# Patient Record
Sex: FEMALE | Race: OTHER | HISPANIC OR LATINO | Employment: OTHER | URBAN - METROPOLITAN AREA
[De-identification: names, ages, dates, MRNs, and addresses within clinical notes are randomized per-mention and may not be internally consistent; named-entity substitution may affect disease eponyms.]

---

## 2023-09-18 ENCOUNTER — HOSPITAL ENCOUNTER (EMERGENCY)
Facility: HOSPITAL | Age: 58
Discharge: HOME | End: 2023-09-19
Attending: EMERGENCY MEDICINE
Payer: MEDICARE

## 2023-09-18 ENCOUNTER — APPOINTMENT (EMERGENCY)
Dept: CT IMAGING | Facility: HOSPITAL | Age: 58
End: 2023-09-18
Payer: MEDICARE

## 2023-09-18 ENCOUNTER — APPOINTMENT (EMERGENCY)
Dept: RADIOLOGY | Facility: HOSPITAL | Age: 58
End: 2023-09-18
Payer: MEDICARE

## 2023-09-18 DIAGNOSIS — I10 HYPERTENSION: Primary | ICD-10-CM

## 2023-09-18 DIAGNOSIS — R51.9 HEADACHE: ICD-10-CM

## 2023-09-18 DIAGNOSIS — R11.0 NAUSEA: ICD-10-CM

## 2023-09-18 LAB
ALBUMIN SERPL BCP-MCNC: 4.5 G/DL (ref 3.5–5)
ALP SERPL-CCNC: 59 U/L (ref 34–104)
ALT SERPL W P-5'-P-CCNC: 16 U/L (ref 7–52)
ANION GAP SERPL CALCULATED.3IONS-SCNC: 8 MMOL/L
AST SERPL W P-5'-P-CCNC: 17 U/L (ref 13–39)
BACTERIA UR QL AUTO: NORMAL /HPF
BASOPHILS # BLD AUTO: 0.02 THOUSANDS/ÂΜL (ref 0–0.1)
BASOPHILS NFR BLD AUTO: 0 % (ref 0–1)
BILIRUB SERPL-MCNC: 0.44 MG/DL (ref 0.2–1)
BILIRUB UR QL STRIP: NEGATIVE
BUN SERPL-MCNC: 10 MG/DL (ref 5–25)
CALCIUM SERPL-MCNC: 9.4 MG/DL (ref 8.4–10.2)
CARDIAC TROPONIN I PNL SERPL HS: 3 NG/L
CHLORIDE SERPL-SCNC: 100 MMOL/L (ref 96–108)
CLARITY UR: CLEAR
CO2 SERPL-SCNC: 29 MMOL/L (ref 21–32)
COLOR UR: ABNORMAL
CREAT SERPL-MCNC: 0.75 MG/DL (ref 0.6–1.3)
EOSINOPHIL # BLD AUTO: 0.07 THOUSAND/ÂΜL (ref 0–0.61)
EOSINOPHIL NFR BLD AUTO: 1 % (ref 0–6)
ERYTHROCYTE [DISTWIDTH] IN BLOOD BY AUTOMATED COUNT: 12.7 % (ref 11.6–15.1)
GFR SERPL CREATININE-BSD FRML MDRD: 88 ML/MIN/1.73SQ M
GLUCOSE SERPL-MCNC: 156 MG/DL (ref 65–140)
GLUCOSE UR STRIP-MCNC: NEGATIVE MG/DL
HCT VFR BLD AUTO: 38.7 % (ref 34.8–46.1)
HGB BLD-MCNC: 13.4 G/DL (ref 11.5–15.4)
HGB UR QL STRIP.AUTO: 25
IMM GRANULOCYTES # BLD AUTO: 0.03 THOUSAND/UL (ref 0–0.2)
IMM GRANULOCYTES NFR BLD AUTO: 1 % (ref 0–2)
KETONES UR STRIP-MCNC: NEGATIVE MG/DL
LEUKOCYTE ESTERASE UR QL STRIP: NEGATIVE
LYMPHOCYTES # BLD AUTO: 2.84 THOUSANDS/ÂΜL (ref 0.6–4.47)
LYMPHOCYTES NFR BLD AUTO: 49 % (ref 14–44)
MCH RBC QN AUTO: 34.7 PG (ref 26.8–34.3)
MCHC RBC AUTO-ENTMCNC: 34.6 G/DL (ref 31.4–37.4)
MCV RBC AUTO: 100 FL (ref 82–98)
MONOCYTES # BLD AUTO: 0.46 THOUSAND/ÂΜL (ref 0.17–1.22)
MONOCYTES NFR BLD AUTO: 8 % (ref 4–12)
NEUTROPHILS # BLD AUTO: 2.35 THOUSANDS/ÂΜL (ref 1.85–7.62)
NEUTS SEG NFR BLD AUTO: 41 % (ref 43–75)
NITRITE UR QL STRIP: NEGATIVE
NON-SQ EPI CELLS URNS QL MICRO: NORMAL /HPF
NRBC BLD AUTO-RTO: 0 /100 WBCS
PH UR STRIP.AUTO: 7 [PH]
PLATELET # BLD AUTO: 253 THOUSANDS/UL (ref 149–390)
PMV BLD AUTO: 9.2 FL (ref 8.9–12.7)
POTASSIUM SERPL-SCNC: 3.8 MMOL/L (ref 3.5–5.3)
PROT SERPL-MCNC: 7.3 G/DL (ref 6.4–8.4)
PROT UR STRIP-MCNC: NEGATIVE MG/DL
RBC # BLD AUTO: 3.86 MILLION/UL (ref 3.81–5.12)
RBC #/AREA URNS AUTO: NORMAL /HPF
SODIUM SERPL-SCNC: 137 MMOL/L (ref 135–147)
SP GR UR STRIP.AUTO: 1.01 (ref 1–1.04)
UROBILINOGEN UA: NEGATIVE MG/DL
WBC # BLD AUTO: 5.77 THOUSAND/UL (ref 4.31–10.16)
WBC #/AREA URNS AUTO: NORMAL /HPF

## 2023-09-18 PROCEDURE — 81001 URINALYSIS AUTO W/SCOPE: CPT | Performed by: EMERGENCY MEDICINE

## 2023-09-18 PROCEDURE — 71046 X-RAY EXAM CHEST 2 VIEWS: CPT

## 2023-09-18 PROCEDURE — 85025 COMPLETE CBC W/AUTO DIFF WBC: CPT | Performed by: EMERGENCY MEDICINE

## 2023-09-18 PROCEDURE — 99284 EMERGENCY DEPT VISIT MOD MDM: CPT

## 2023-09-18 PROCEDURE — 84484 ASSAY OF TROPONIN QUANT: CPT | Performed by: EMERGENCY MEDICINE

## 2023-09-18 PROCEDURE — 80053 COMPREHEN METABOLIC PANEL: CPT | Performed by: EMERGENCY MEDICINE

## 2023-09-18 PROCEDURE — 99285 EMERGENCY DEPT VISIT HI MDM: CPT | Performed by: EMERGENCY MEDICINE

## 2023-09-18 PROCEDURE — G1004 CDSM NDSC: HCPCS

## 2023-09-18 PROCEDURE — 70450 CT HEAD/BRAIN W/O DYE: CPT

## 2023-09-18 PROCEDURE — 96361 HYDRATE IV INFUSION ADD-ON: CPT

## 2023-09-18 PROCEDURE — 36415 COLL VENOUS BLD VENIPUNCTURE: CPT | Performed by: EMERGENCY MEDICINE

## 2023-09-18 PROCEDURE — 93005 ELECTROCARDIOGRAM TRACING: CPT

## 2023-09-18 RX ORDER — DULOXETIN HYDROCHLORIDE 30 MG/1
30 CAPSULE, DELAYED RELEASE ORAL DAILY
COMMUNITY
Start: 2023-09-12 | End: 2024-09-11

## 2023-09-18 RX ORDER — BUSPIRONE HYDROCHLORIDE 10 MG/1
10 TABLET ORAL 2 TIMES DAILY
COMMUNITY
Start: 2022-10-31 | End: 2023-10-26

## 2023-09-18 RX ORDER — ENALAPRIL MALEATE 20 MG/1
40 TABLET ORAL
COMMUNITY
Start: 2023-01-05 | End: 2024-01-05

## 2023-09-18 RX ADMIN — SODIUM CHLORIDE 1000 ML: 0.9 INJECTION, SOLUTION INTRAVENOUS at 23:10

## 2023-09-19 VITALS
RESPIRATION RATE: 16 BRPM | HEART RATE: 75 BPM | TEMPERATURE: 99.4 F | DIASTOLIC BLOOD PRESSURE: 78 MMHG | SYSTOLIC BLOOD PRESSURE: 134 MMHG | OXYGEN SATURATION: 99 % | WEIGHT: 159.5 LBS

## 2023-09-19 LAB
ATRIAL RATE: 92 BPM
P AXIS: 29 DEGREES
PR INTERVAL: 108 MS
QRS AXIS: -8 DEGREES
QRSD INTERVAL: 88 MS
QT INTERVAL: 372 MS
QTC INTERVAL: 460 MS
T WAVE AXIS: 54 DEGREES
VENTRICULAR RATE: 92 BPM

## 2023-09-19 PROCEDURE — 93010 ELECTROCARDIOGRAM REPORT: CPT | Performed by: INTERNAL MEDICINE

## 2023-09-19 PROCEDURE — 96374 THER/PROPH/DIAG INJ IV PUSH: CPT

## 2023-09-19 PROCEDURE — 96361 HYDRATE IV INFUSION ADD-ON: CPT

## 2023-09-19 RX ORDER — ONDANSETRON 2 MG/ML
4 INJECTION INTRAMUSCULAR; INTRAVENOUS ONCE
Status: COMPLETED | OUTPATIENT
Start: 2023-09-19 | End: 2023-09-19

## 2023-09-19 RX ORDER — ONDANSETRON 4 MG/1
4 TABLET, ORALLY DISINTEGRATING ORAL EVERY 8 HOURS PRN
Qty: 10 TABLET | Refills: 0 | Status: SHIPPED | OUTPATIENT
Start: 2023-09-19

## 2023-09-19 RX ORDER — ACETAMINOPHEN 325 MG/1
975 TABLET ORAL ONCE
Status: COMPLETED | OUTPATIENT
Start: 2023-09-19 | End: 2023-09-19

## 2023-09-19 RX ADMIN — ACETAMINOPHEN 975 MG: 325 TABLET ORAL at 00:29

## 2023-09-19 RX ADMIN — ONDANSETRON 4 MG: 2 INJECTION INTRAMUSCULAR; INTRAVENOUS at 00:29

## 2023-09-19 NOTE — ED PROVIDER NOTES
History  Chief Complaint   Patient presents with   • High Blood Pressure     Pt reports she started feeling strange at home around 10 pm, reports having a halo in her left eye, then lightheadedness. Reports her BP was 176/89.      52-year-old female presents with complaint of hypertension. She reports taking her medications as prescribed. This evening she had mild headache and lightheadedness which have since resolved. She denies chest pain or focal neurological deficits. She was having mild trouble breathing which she described as a congestion type sensation in her nose and throat. That has also improved. Hypertension  Severity:  Moderate  Onset quality:  Gradual  Timing:  Constant  Progression:  Partially resolved  Chronicity:  Recurrent  Relieved by:  Nothing  Worsened by:  Nothing  Ineffective treatments:  None tried  Associated symptoms: headaches (mild, resolved) and shortness of breath (mild, resolved)    Associated symptoms: no abdominal pain, no blurred vision, no chest pain, no confusion, no epistaxis, no loss of consciousness, no nausea and no peripheral edema        Prior to Admission Medications   Prescriptions Last Dose Informant Patient Reported? Taking?    DULoxetine (CYMBALTA) 30 mg delayed release capsule Not Taking  Yes No   Sig: Take 30 mg by mouth daily   Patient not taking: Reported on 9/18/2023   busPIRone (BUSPAR) 10 mg tablet Not Taking  Yes No   Sig: Take 10 mg by mouth 2 (two) times a day   Patient not taking: Reported on 9/18/2023   enalapril (VASOTEC) 20 mg tablet 9/18/2023  Yes Yes   Sig: Take 40 mg by mouth   rucaparib (RUBRACA) 300 mg tablet 9/18/2023  Yes Yes   Sig: Take 600 mg by mouth every 12 (twelve) hours      Facility-Administered Medications: None       Past Medical History:   Diagnosis Date   • Asthma    • Hypertension    • Ovarian cancer [facility])    • Prediabetes        Past Surgical History:   Procedure Laterality Date   • CT NEEDLE BIOPSY LYMPH NODE  06/30/2022   • HYSTERECTOMY         History reviewed. No pertinent family history. I have reviewed and agree with the history as documented. E-Cigarette/Vaping   • E-Cigarette Use Never User      E-Cigarette/Vaping Substances     Social History     Tobacco Use   • Smoking status: Never   • Smokeless tobacco: Never   Vaping Use   • Vaping Use: Never used   Substance Use Topics   • Alcohol use: Never   • Drug use: Never       Review of Systems   HENT: Negative for nosebleeds. Eyes: Negative for blurred vision. Respiratory: Positive for shortness of breath (mild, resolved). Cardiovascular: Negative for chest pain. Gastrointestinal: Negative for abdominal pain and nausea. Neurological: Positive for headaches (mild, resolved). Negative for loss of consciousness. Psychiatric/Behavioral: Negative for confusion. All other systems reviewed and are negative. Physical Exam  Physical Exam  Vitals and nursing note reviewed. Constitutional:       General: She is not in acute distress. Appearance: Normal appearance. She is well-developed. She is not ill-appearing or toxic-appearing. HENT:      Head: Normocephalic and atraumatic. Right Ear: External ear normal.      Left Ear: External ear normal.      Nose: Nose normal. No congestion. Mouth/Throat:      Mouth: Mucous membranes are moist.      Pharynx: Oropharynx is clear. Eyes:      Conjunctiva/sclera: Conjunctivae normal.      Pupils: Pupils are equal, round, and reactive to light. Cardiovascular:      Rate and Rhythm: Normal rate and regular rhythm. Heart sounds: Normal heart sounds. Pulmonary:      Effort: Pulmonary effort is normal. No respiratory distress. Breath sounds: Normal breath sounds. No wheezing. Abdominal:      General: Bowel sounds are normal.      Palpations: Abdomen is soft. Tenderness: There is no abdominal tenderness. There is no guarding. Musculoskeletal:         General: No tenderness or deformity. Cervical back: Normal range of motion and neck supple. No rigidity. Skin:     General: Skin is warm and dry. Capillary Refill: Capillary refill takes less than 2 seconds. Findings: No rash. Neurological:      General: No focal deficit present. Mental Status: She is alert and oriented to person, place, and time.    Psychiatric:         Mood and Affect: Mood normal.         Behavior: Behavior normal.         Vital Signs  ED Triage Vitals   Temperature Pulse Respirations Blood Pressure SpO2   09/18/23 2227 09/18/23 2227 09/18/23 2227 09/18/23 2227 09/18/23 2227   99.4 °F (37.4 °C) 98 20 (!) 173/93 100 %      Temp Source Heart Rate Source Patient Position - Orthostatic VS BP Location FiO2 (%)   09/18/23 2227 09/18/23 2227 09/18/23 2227 09/18/23 2227 --   Tympanic Monitor Sitting Right arm       Pain Score       09/19/23 0029       7           Vitals:    09/18/23 2227 09/18/23 2300 09/19/23 0015   BP: (!) 173/93 139/83 134/78   Pulse: 98  75   Patient Position - Orthostatic VS: Sitting Sitting Sitting         Visual Acuity      ED Medications  Medications   sodium chloride 0.9 % bolus 1,000 mL (1,000 mL Intravenous New Bag 9/18/23 2310)   acetaminophen (TYLENOL) tablet 975 mg (975 mg Oral Given 9/19/23 0029)   ondansetron (ZOFRAN) injection 4 mg (4 mg Intravenous Given 9/19/23 0029)       Diagnostic Studies  Results Reviewed     Procedure Component Value Units Date/Time    HS Troponin I 4hr [[id number]]     Lab Status: No result Specimen: Blood     Comprehensive metabolic panel [[id number]]  (Abnormal) Collected: 09/18/23 2251    Lab Status: Final result Specimen: Blood from Arm, Right Updated: 09/18/23 2327     Sodium 137 mmol/L      Potassium 3.8 mmol/L      Chloride 100 mmol/L      CO2 29 mmol/L      ANION GAP 8 mmol/L      BUN 10 mg/dL      Creatinine 0.75 mg/dL      Glucose 156 mg/dL      Calcium 9.4 mg/dL      AST 17 U/L      ALT 16 U/L      Alkaline Phosphatase 59 U/L      Total Protein 7.3 g/dL Albumin 4.5 g/dL      Total Bilirubin 0.44 mg/dL      eGFR 88 ml/min/1.73sq m     Narrative:      Specimen Lipemic.   National Kidney Disease Foundation guidelines for Chronic Kidney Disease (CKD):   •  Stage 1 with normal or high GFR (GFR > 90 mL/min/1.73 square meters)  •  Stage 2 Mild CKD (GFR = 60-89 mL/min/1.73 square meters)  •  Stage 3A Moderate CKD (GFR = 45-59 mL/min/1.73 square meters)  •  Stage 3B Moderate CKD (GFR = 30-44 mL/min/1.73 square meters)  •  Stage 4 Severe CKD (GFR = 15-29 mL/min/1.73 square meters)  •  Stage 5 End Stage CKD (GFR <15 mL/min/1.73 square meters)  Note: GFR calculation is accurate only with a steady state creatinine    HS Troponin 0hr (reflex protocol) [[id number]]  (Normal) Collected: 09/18/23 2251    Lab Status: Final result Specimen: Blood from Arm, Right Updated: 09/18/23 2326     hs TnI 0hr 3 ng/L     HS Troponin I 2hr [[id number]]     Lab Status: No result Specimen: Blood     Urine Microscopic [[id number]]  (Normal) Collected: 09/18/23 2257    Lab Status: Final result Specimen: Urine, Clean Catch Updated: 09/18/23 2313     RBC, UA 0-1 /hpf      WBC, UA None Seen /hpf      Epithelial Cells Occasional /hpf      Bacteria, UA Occasional /hpf     UA (URINE) with reflex to Scope [[id number]]  (Abnormal) Collected: 09/18/23 2257    Lab Status: Final result Specimen: Urine, Clean Catch Updated: 09/18/23 2313     Color, UA Straw     Clarity, UA Clear     Specific Gravity, UA 1.010     pH, UA 7.0     Leukocytes, UA Negative     Nitrite, UA Negative     Protein, UA Negative mg/dl      Glucose, UA Negative mg/dl      Ketones, UA Negative mg/dl      Bilirubin, UA Negative     Occult Blood, UA 25.0     UROBILINOGEN UA Negative mg/dL     CBC and differential [[id number]]  (Abnormal) Collected: 09/18/23 2251    Lab Status: Final result Specimen: Blood from Arm, Right Updated: 09/18/23 2304     WBC 5.77 Thousand/uL      RBC 3.86 Million/uL      Hemoglobin 13.4 g/dL      Hematocrit 38.7 %  fL      MCH 34.7 pg      MCHC 34.6 g/dL      RDW 12.7 %      MPV 9.2 fL      Platelets 378 Thousands/uL      nRBC 0 /100 WBCs      Neutrophils Relative 41 %      Immat GRANS % 1 %      Lymphocytes Relative 49 %      Monocytes Relative 8 %      Eosinophils Relative 1 %      Basophils Relative 0 %      Neutrophils Absolute 2.35 Thousands/µL      Immature Grans Absolute 0.03 Thousand/uL      Lymphocytes Absolute 2.84 Thousands/µL      Monocytes Absolute 0.46 Thousand/µL      Eosinophils Absolute 0.07 Thousand/µL      Basophils Absolute 0.02 Thousands/µL                  XR chest 2 views   ED Interpretation by Lizzy Pittman DO (09/19 0007)   No acute findings        CT head without contrast   Final Result by Lauryn Diana MD (09/18 2358)      No acute intracranial abnormality. Left maxillary sinus opacification partially seen. Workstation performed: [id number]                    Procedures  ECG 12 Lead Documentation Only    Date/Time: 9/18/2023 10:35 PM    Performed by: Lizzy Pittman DO  Authorized by: Lizzy Pittman DO    ECG reviewed by me, the ED Provider: yes    Patient location:  ED  Rate:     ECG rate:  92    ECG rate assessment: normal    Rhythm:     Rhythm: sinus rhythm    Ectopy:     Ectopy: none    QRS:     QRS axis:  Normal  Conduction:     Conduction: normal    ST segments:     ST segments:  Normal  T waves:     T waves: non-specific               ED Course  ED Course as of 09/19/23 0100   Tue Sep 19, 2023   0042 BP has trended down with no intervention. Mild h/a remains. Treated with tylenol. SBIRT 20yo+    Flowsheet Row Most Recent Value   Initial Alcohol Screen: US AUDIT-C     1. How often do you have a drink containing alcohol? 0 Filed at: 09/18/2023 2227   2. How many drinks containing alcohol do you have on a typical day you are drinking? 0 Filed at: 09/18/2023 2227   3a. Male UNDER 65:  How often do you have five or more drinks on one occasion? 0 Filed at: 09/18/2023 2227   3b. FEMALE Any Age, or MALE 65+: How often do you have 4 or more drinks on one occassion? 0 Filed at: 09/18/2023 2227   Audit-C Score 0 Filed at: 09/18/2023 2227   DEVEN: How many times in the past year have you. .. Used an illegal drug or used a prescription medication for non-medical reasons? Never Filed at: 09/18/2023 2227                    Medical Decision Making  52-year-old female presents with complaint of headache and hypertension. Her blood pressure trended down with no interventions being required. Work-up was negative for evidence of stroke or bleed. Labs are unremarkable. Patient did experience mild headache and nausea which improved with Tylenol and Zofran. Patient is anxious for discharge and will be following up closely with her primary care physician. She is aware of reasons return to the ER. Amount and/or Complexity of Data Reviewed  Labs: ordered. Radiology: ordered and independent interpretation performed. Risk  OTC drugs. Prescription drug management. Disposition  Final diagnoses:   Hypertension   Headache   Nausea     Time reflects when diagnosis was documented in both MDM as applicable and the Disposition within this note     Time User Action Codes Description Comment    9/19/2023 12:18 AM Karma Reed Add [I10] Hypertension     9/19/2023 12:59 AM Karma Reed Add [R51.9] Headache     9/19/2023 12:59 AM Karma Reed Add [R11.0] Nausea       ED Disposition     ED Disposition   Discharge    Condition   Stable    Date/Time   Tue Sep 19, 2023 12:59 AM    Comment   Danielle Guzman discharge to home/self care.                Follow-up Information     Follow up With Specialties Details Why [address] [facility]   [address] [place], [place] [id number] [phone]            Patient's Medications   Discharge Prescriptions    ONDANSETRON (ZOFRAN-ODT) 4 MG DISINTEGRATING TABLET    Take 1 tablet (4 mg total) by mouth every 8 (eight) hours as needed for nausea or vomiting       Start Date: 9/19/2023 End Date: --       Order Dose: 4 mg       Quantity: 10 tablet    Refills: 0       No discharge procedures on file.     PDMP Review     None          ED Provider  Electronically Signed by           Flori Jon DO  09/19/23 0100

## 2024-02-03 ENCOUNTER — HOSPITAL ENCOUNTER (EMERGENCY)
Facility: HOSPITAL | Age: 59
Discharge: HOME | End: 2024-02-03
Attending: EMERGENCY MEDICINE
Payer: MEDICARE

## 2024-02-03 VITALS
WEIGHT: 157 LBS | DIASTOLIC BLOOD PRESSURE: 85 MMHG | TEMPERATURE: 97.7 F | HEART RATE: 80 BPM | SYSTOLIC BLOOD PRESSURE: 149 MMHG | RESPIRATION RATE: 17 BRPM | OXYGEN SATURATION: 97 %

## 2024-02-03 DIAGNOSIS — K08.89 PAIN, DENTAL: Primary | ICD-10-CM

## 2024-02-03 PROCEDURE — 64450 NJX AA&/STRD OTHER PN/BRANCH: CPT

## 2024-02-03 PROCEDURE — 99284 EMERGENCY DEPT VISIT MOD MDM: CPT

## 2024-02-03 PROCEDURE — 99282 EMERGENCY DEPT VISIT SF MDM: CPT

## 2024-02-03 RX ORDER — ROPIVACAINE HYDROCHLORIDE 5 MG/ML
15 INJECTION, SOLUTION EPIDURAL; INFILTRATION; PERINEURAL ONCE
Status: COMPLETED | OUTPATIENT
Start: 2024-02-03 | End: 2024-02-03

## 2024-02-03 RX ORDER — ALBUTEROL SULFATE 2.5 MG/3ML
2.5 SOLUTION RESPIRATORY (INHALATION) EVERY 4 HOURS PRN
COMMUNITY
Start: 2023-11-01 | End: 2024-10-31

## 2024-02-03 RX ORDER — OXYCODONE HYDROCHLORIDE 5 MG/1
5 TABLET ORAL EVERY 4 HOURS PRN
Qty: 5 TABLET | Refills: 0 | Status: SHIPPED | OUTPATIENT
Start: 2024-02-03 | End: 2024-02-13

## 2024-02-03 RX ORDER — CLINDAMYCIN HYDROCHLORIDE 150 MG/1
450 CAPSULE ORAL 3 TIMES DAILY
Qty: 63 CAPSULE | Refills: 0 | Status: SHIPPED | OUTPATIENT
Start: 2024-02-03 | End: 2024-02-10

## 2024-02-03 RX ORDER — CLINDAMYCIN HYDROCHLORIDE 150 MG/1
450 CAPSULE ORAL 3 TIMES DAILY
Qty: 63 CAPSULE | Refills: 0 | Status: SHIPPED | OUTPATIENT
Start: 2024-02-03 | End: 2024-02-03

## 2024-02-03 RX ORDER — VILAZODONE HYDROCHLORIDE 20 MG/1
TABLET ORAL
COMMUNITY
Start: 2023-09-15

## 2024-02-03 RX ORDER — PROCHLORPERAZINE MALEATE 10 MG
10 TABLET ORAL EVERY 6 HOURS PRN
COMMUNITY
Start: 2023-11-01

## 2024-02-03 RX ORDER — EPINEPHRINE 0.3 MG/.3ML
0.3 INJECTION SUBCUTANEOUS
COMMUNITY
Start: 2023-12-07

## 2024-02-03 RX ADMIN — ROPIVACAINE HYDROCHLORIDE 15 ML: 5 INJECTION EPIDURAL; INFILTRATION; PERINEURAL at 14:53

## 2024-02-03 NOTE — Clinical Note
Deja Melvin was seen and treated in our emergency department on 2/3/2024.    No restrictions            Diagnosis:     Deja  .    She may return on this date: 02/05/2024         If you have any questions or concerns, please don't hesitate to call.      Valentin Flores PA-C    ______________________________           _______________          _______________  Hospital Representative                              Date                                Time

## 2024-02-03 NOTE — ED PROVIDER NOTES
History  Chief Complaint   Patient presents with    Dental Pain     Patient reports on Thursday she bit into a piece of meat that had bone and filling felt like it fell deep into the tooth, complains of pain radiating to the right ear. Has an appointment on Monday. Took [facility] for pain     Patient is a 59-year-old female coming in for evaluation of right lower dental pain.  Reports has been going on 3 days after she bit into a tough piece of meat.  Patient does have a history of ovarian cancer, is currently on treatment, but has been in treatment in the past.      Dental Pain  Location:  Lower  Context: abscess and dental caries    Associated symptoms: facial pain    Associated symptoms: no difficulty swallowing, no drooling, no facial swelling, no fever, no neck pain, no oral bleeding and no trismus    Risk factors: immunosuppression        Prior to Admission Medications   Prescriptions Last Dose Informant Patient Reported? Taking?   DULoxetine (CYMBALTA) 30 mg delayed release capsule   Yes No   Sig: Take 30 mg by mouth daily   Patient not taking: Reported on 9/18/2023   EPINEPHrine (EPIPEN) 0.3 mg/0.3 mL SOAJ   Yes Yes   Sig: Inject 0.3 mg into a muscle   Erdafitinib 4 MG TABS   Yes Yes   Sig: Take 8 mg by mouth daily   albuterol (2.5 mg/3 mL) 0.083 % nebulizer solution   Yes Yes   Sig: Inhale 2.5 mg every 4 (four) hours as needed   enalapril (VASOTEC) 20 mg tablet   Yes No   Sig: Take 40 mg by mouth   ondansetron (ZOFRAN-ODT) 4 mg disintegrating tablet   No No   Sig: Take 1 tablet (4 mg total) by mouth every 8 (eight) hours as needed for nausea or vomiting   prochlorperazine (COMPAZINE) 10 mg tablet   Yes Yes   Sig: Take 10 mg by mouth every 6 (six) hours as needed   vilazodone (VIIBRYD) 20 mg tablet   Yes Yes   Sig: TAKE 1 TABLET (20 MG TOTAL) BY MOUTH EVERY MORNING.      Facility-Administered Medications: None       Past Medical History:   Diagnosis Date    Asthma     Hypertension     Ovarian cancer (HCC)      Prediabetes        Past Surgical History:   Procedure Laterality Date    CT NEEDLE BIOPSY LYMPH NODE  06/30/2022    HYSTERECTOMY         History reviewed. No pertinent family history.  I have reviewed and agree with the history as documented.    E-Cigarette/Vaping    E-Cigarette Use Never User      E-Cigarette/Vaping Substances     Social History     Tobacco Use    Smoking status: Never    Smokeless tobacco: Never   Vaping Use    Vaping status: Never Used   Substance Use Topics    Alcohol use: Never    Drug use: Never       Review of Systems   Constitutional:  Negative for chills, fatigue and fever.   HENT:  Positive for dental problem. Negative for drooling and facial swelling.    Musculoskeletal:  Negative for neck pain.       Physical Exam  Physical Exam  Vitals reviewed.   Constitutional:       Appearance: Normal appearance. She is normal weight.   HENT:      Head: Normocephalic and atraumatic.      Right Ear: External ear normal.      Left Ear: External ear normal.      Nose: Nose normal.      Mouth/Throat:      Comments: Right lower second molar has severe dental cavity, covered with a crown.  No erythema or swelling of the gumline  Eyes:      Conjunctiva/sclera: Conjunctivae normal.   Cardiovascular:      Rate and Rhythm: Normal rate.   Pulmonary:      Effort: Pulmonary effort is normal.   Musculoskeletal:         General: Normal range of motion.      Cervical back: Normal range of motion.   Skin:     General: Skin is warm and dry.   Neurological:      Mental Status: She is alert.         Vital Signs  ED Triage Vitals [02/03/24 1434]   Temperature Pulse Respirations Blood Pressure SpO2   97.7 °F (36.5 °C) 80 17 149/85 97 %      Temp Source Heart Rate Source Patient Position - Orthostatic VS BP Location FiO2 (%)   Oral Monitor Sitting Left arm --      Pain Score       10 - Worst Possible Pain           Vitals:    02/03/24 1434   BP: 149/85   Pulse: 80   Patient Position - Orthostatic VS: Sitting         Visual  Acuity      ED Medications  Medications   ropivacaine (NAROPIN) 0.5 % injection 15 mL (15 mL Infiltration Given by Other 2/3/24 2107)       Diagnostic Studies  Results Reviewed       None                   No orders to display              Procedures  Nerve block    Date/Time: 2/3/2024 5:37 PM    Performed by: Valentin Flores PA-C  Authorized by: Valentin Flores PA-C    Indications:     Indications:  Pain relief  Location:     Body area:  Head    Head nerve:  Facial    Nerve type:  Peripheral    Laterality:  Right  Procedure details (see MAR for exact dosages):     Block needle gauge:  24 G    Anesthetic injected:  Ropivacaine 0.5%    Injection procedure:  Anatomic landmarks identified  Post-procedure details:     Outcome:  Pain relieved    Patient tolerance of procedure:  Tolerated well, no immediate complications           ED Course                               SBIRT 22yo+      Flowsheet Row Most Recent Value   Initial Alcohol Screen: US AUDIT-C     1. How often do you have a drink containing alcohol? 0 Filed at: 02/03/2024 1440   2. How many drinks containing alcohol do you have on a typical day you are drinking?  0 Filed at: 02/03/2024 1440   3a. Male UNDER 65: How often do you have five or more drinks on one occasion? 0 Filed at: 02/03/2024 1440   3b. FEMALE Any Age, or MALE 65+: How often do you have 4 or more drinks on one occassion? 0 Filed at: 02/03/2024 1440   Audit-C Score 0 Filed at: 02/03/2024 1440   DEVEN: How many times in the past year have you...    Used an illegal drug or used a prescription medication for non-medical reasons? Never Filed at: 02/03/2024 1440                      Medical Decision Making  Patient is a 59-year-old female, coming in for evaluation of dental pain.  No sign of infection at this time, will start on antibiotics due to deep nature of cavity.  Does have an upcoming appointment with a dentist in 2 days.  Will give a short dose of narcotics for pain relief.  Informed of  being careful due to addictive nature, as well as dangers    Risk  Prescription drug management.             Disposition  Final diagnoses:   Pain, dental     Time reflects when diagnosis was documented in both MDM as applicable and the Disposition within this note       Time User Action Codes Description Comment    2/3/2024  2:49 PM Valentin Flores Add [K08.89] Pain, dental           ED Disposition       ED Disposition   Discharge    Condition   Stable    Date/Time   Sat Feb 3, 2024 1449    Comment   Deja E Ngozi discharge to home/self care.                   Follow-up Information       Follow up With Specialties Details Why Contact Info Additional Information    [facility]   [address] [facility] [address]  [phone]       [facility] Emergency Medicine  As needed, If symptoms worsen [address] [facility] [id number] [phone] [facility]            Discharge Medication List as of 2/3/2024  2:50 PM        START taking these medications    Details   oxyCODONE (Roxicodone) 5 immediate release tablet Take 1 tablet (5 mg total) by mouth every 4 (four) hours as needed for moderate pain for up to 10 days Max Daily Amount: 30 mg, Starting Sat 2/3/2024, Until Tue 2/13/2024 at 2359, Normal      clindamycin (CLEOCIN) 150 mg capsule Take 3 capsules (450 mg total) by mouth 3 (three) times a day for 7 days, Starting Sat 2/3/2024, Until Sat 2/10/2024, Print           CONTINUE these medications which have NOT CHANGED    Details   albuterol (2.5 mg/3 mL) 0.083 % nebulizer solution Inhale 2.5 mg every 4 (four) hours as needed, Starting Wed 11/1/2023, Until Thu 10/31/2024 at 2359, Historical Med      EPINEPHrine (EPIPEN) 0.3 mg/0.3 mL SOAJ Inject 0.3 mg into a muscle, Starting Thu 12/7/2023, Historical Med      Erdafitinib 4 MG TABS Take 8 mg by mouth daily, Starting Thu 12/21/2023,  Historical Med      prochlorperazine (COMPAZINE) 10 mg tablet Take 10 mg by mouth every 6 (six) hours as needed, Starting Wed 11/1/2023, Historical Med      vilazodone (VIIBRYD) 20 mg tablet TAKE 1 TABLET (20 MG TOTAL) BY MOUTH EVERY MORNING., Historical Med      DULoxetine (CYMBALTA) 30 mg delayed release capsule Take 30 mg by mouth daily, Starting Tue 9/12/2023, Until Wed 9/11/2024, Historical Med      enalapril (VASOTEC) 20 mg tablet Take 40 mg by mouth, Starting Thu 1/5/2023, Until Fri 1/5/2024 at 2359, Historical Med      ondansetron (ZOFRAN-ODT) 4 mg disintegrating tablet Take 1 tablet (4 mg total) by mouth every 8 (eight) hours as needed for nausea or vomiting, Starting Tue 9/19/2023, Print             No discharge procedures on file.    PDMP Review       None            ED Provider  Electronically Signed by             Valentin Flores PA-C  02/03/24 1963

## 2024-02-25 ENCOUNTER — HOSPITAL ENCOUNTER (EMERGENCY)
Facility: HOSPITAL | Age: 59
Discharge: HOME | End: 2024-02-25
Attending: EMERGENCY MEDICINE
Payer: MEDICARE

## 2024-02-25 VITALS
SYSTOLIC BLOOD PRESSURE: 125 MMHG | HEART RATE: 98 BPM | TEMPERATURE: 98.4 F | DIASTOLIC BLOOD PRESSURE: 87 MMHG | RESPIRATION RATE: 20 BRPM | OXYGEN SATURATION: 98 % | WEIGHT: 161.2 LBS

## 2024-02-25 DIAGNOSIS — T78.40XA ALLERGIC REACTION: Primary | ICD-10-CM

## 2024-02-25 PROCEDURE — 99284 EMERGENCY DEPT VISIT MOD MDM: CPT | Performed by: EMERGENCY MEDICINE

## 2024-02-25 PROCEDURE — 99282 EMERGENCY DEPT VISIT SF MDM: CPT

## 2024-02-25 RX ORDER — DEXAMETHASONE 4 MG/1
10 TABLET ORAL ONCE
Status: COMPLETED | OUTPATIENT
Start: 2024-02-25 | End: 2024-02-25

## 2024-02-25 RX ADMIN — DEXAMETHASONE 10 MG: 4 TABLET ORAL at 15:18

## 2024-02-25 NOTE — ED PROVIDER NOTES
History  Chief Complaint   Patient presents with    Allergic Reaction     Pt states that she took a bite of a pear and started to have an allergic reaction. States she took benadryl at about 1340 and then used an epi-pen at about 1410. Staets she feels shaky now     HPI  Patient is a 59-year-old female presenting with concerns for an allergic reaction.  Patient states she actually took a bite of a pair which she is allergic to.  Patient states that she felt like her throat was itchy and so she took Benadryl soon afterwards.  This occurred just couple hours prior to arrival.  Afterwards she still felt lightheaded and off so notified her daughter who administered her EpiPen. After she took the EpiPen she felt like her heart was racing and noticed that her blood pressure went up so decided come to the emergency department for evaluation.  Patient denies any shortness of breath, throat swelling, rash, difficulty swallowing.  Currently feels like she is asymptomatic.  Reports no other complaints.  Prior to Admission Medications   Prescriptions Last Dose Informant Patient Reported? Taking?   DULoxetine (CYMBALTA) 30 mg delayed release capsule   Yes No   Sig: Take 30 mg by mouth daily   Patient not taking: Reported on 9/18/2023   EPINEPHrine (EPIPEN) 0.3 mg/0.3 mL SOAJ   Yes No   Sig: Inject 0.3 mg into a muscle   Erdafitinib 4 MG TABS   Yes No   Sig: Take 8 mg by mouth daily   albuterol (2.5 mg/3 mL) 0.083 % nebulizer solution   Yes No   Sig: Inhale 2.5 mg every 4 (four) hours as needed   enalapril (VASOTEC) 20 mg tablet   Yes No   Sig: Take 40 mg by mouth   ondansetron (ZOFRAN-ODT) 4 mg disintegrating tablet   No No   Sig: Take 1 tablet (4 mg total) by mouth every 8 (eight) hours as needed for nausea or vomiting   prochlorperazine (COMPAZINE) 10 mg tablet   Yes No   Sig: Take 10 mg by mouth every 6 (six) hours as needed   vilazodone (VIIBRYD) 20 mg tablet   Yes No   Sig: TAKE 1 TABLET (20 MG TOTAL) BY MOUTH EVERY  MORNING.      Facility-Administered Medications: None       Past Medical History:   Diagnosis Date    Asthma     Hypertension     Ovarian cancer (HCC)     Prediabetes        Past Surgical History:   Procedure Laterality Date    CT NEEDLE BIOPSY LYMPH NODE  06/30/2022    HYSTERECTOMY         History reviewed. No pertinent family history.  I have reviewed and agree with the history as documented.    E-Cigarette/Vaping    E-Cigarette Use Never User      E-Cigarette/Vaping Substances     Social History     Tobacco Use    Smoking status: Never    Smokeless tobacco: Never   Vaping Use    Vaping status: Never Used   Substance Use Topics    Alcohol use: Never    Drug use: Never       Review of Systems   Constitutional:  Negative for chills, diaphoresis, fever and unexpected weight change.   HENT:  Negative for ear pain and sore throat.    Eyes:  Negative for visual disturbance.   Respiratory:  Negative for cough, chest tightness and shortness of breath.    Cardiovascular:  Negative for chest pain and leg swelling.   Gastrointestinal:  Negative for abdominal distention, abdominal pain, constipation, diarrhea, nausea and vomiting.   Endocrine: Negative.    Genitourinary:  Negative for difficulty urinating and dysuria.   Musculoskeletal: Negative.    Skin: Negative.    Allergic/Immunologic: Negative.    Neurological: Negative.    Hematological: Negative.    Psychiatric/Behavioral: Negative.     All other systems reviewed and are negative.      Physical Exam  Physical Exam  Vitals and nursing note reviewed.   Constitutional:       General: She is not in acute distress.     Appearance: Normal appearance. She is not ill-appearing.   HENT:      Head: Normocephalic and atraumatic.      Right Ear: External ear normal.      Left Ear: External ear normal.      Nose: Nose normal.      Mouth/Throat:      Mouth: Mucous membranes are moist.      Pharynx: Oropharynx is clear.   Eyes:      General: No scleral icterus.        Right eye: No  discharge.         Left eye: No discharge.      Extraocular Movements: Extraocular movements intact.      Conjunctiva/sclera: Conjunctivae normal.      Pupils: Pupils are equal, round, and reactive to light.   Cardiovascular:      Rate and Rhythm: Normal rate and regular rhythm.      Pulses: Normal pulses.      Heart sounds: Normal heart sounds.   Pulmonary:      Effort: Pulmonary effort is normal.      Breath sounds: Normal breath sounds.   Abdominal:      General: Abdomen is flat. Bowel sounds are normal. There is no distension.      Palpations: Abdomen is soft.      Tenderness: There is no abdominal tenderness. There is no guarding or rebound.   Musculoskeletal:         General: Normal range of motion.      Cervical back: Normal range of motion and neck supple.   Skin:     General: Skin is warm and dry.      Capillary Refill: Capillary refill takes less than 2 seconds.   Neurological:      General: No focal deficit present.      Mental Status: She is alert and oriented to person, place, and time. Mental status is at baseline.   Psychiatric:         Mood and Affect: Mood normal.         Behavior: Behavior normal.         Thought Content: Thought content normal.         Judgment: Judgment normal.         Vital Signs  ED Triage Vitals [02/25/24 1453]   Temperature Pulse Respirations Blood Pressure SpO2   98.4 °F (36.9 °C) (!) 116 20 (!) 189/90 98 %      Temp Source Heart Rate Source Patient Position - Orthostatic VS BP Location FiO2 (%)   Tympanic Monitor Lying Left arm --      Pain Score       --           Vitals:    02/25/24 1453 02/25/24 1509 02/25/24 1519   BP: (!) 189/90  125/87   Pulse: (!) 116 (!) 110 98   Patient Position - Orthostatic VS: Lying           Visual Acuity      ED Medications  Medications   dexamethasone (DECADRON) tablet 10 mg (10 mg Oral Given 2/25/24 1518)       Diagnostic Studies  Results Reviewed       None                   No orders to display              Procedures  Procedures          ED Course                               SBIRT 22yo+      Flowsheet Row Most Recent Value   Initial Alcohol Screen: US AUDIT-C     1. How often do you have a drink containing alcohol? 0 Filed at: 02/25/2024 1519   2. How many drinks containing alcohol do you have on a typical day you are drinking?  0 Filed at: 02/25/2024 1519   3a. Male UNDER 65: How often do you have five or more drinks on one occasion? 0 Filed at: 02/25/2024 1519   3b. FEMALE Any Age, or MALE 65+: How often do you have 4 or more drinks on one occassion? 0 Filed at: 02/25/2024 1520   Audit-C Score 0 Filed at: 02/25/2024 1520   DEVEN: How many times in the past year have you...    Used an illegal drug or used a prescription medication for non-medical reasons? Never Filed at: 02/25/2024 1519                      Medical Decision Making  59-year-old female presenting with suppose it allergic reaction  On examination patient has no signs of anaphylactic reaction  Throat appears to be normal with no signs of swelling  Patient without any rash  Lungs are clear to auscultation with no signs of wheezing  However patient did receive EpiPen as well as Benadryl  Will give systemic steroids  Reassurance given and patient was discharged with strict return precautions given that patient may experience delayed reaction    Problems Addressed:  Allergic reaction: acute illness or injury    Risk  Prescription drug management.             Disposition  Final diagnoses:   Allergic reaction     Time reflects when diagnosis was documented in both MDM as applicable and the Disposition within this note       Time User Action Codes Description Comment    2/25/2024  3:48 PM Hang Natarajan Add [T78.40XA] Allergic reaction           ED Disposition       ED Disposition   Discharge    Condition   Stable    Date/Time   Sun Feb 25, 2024 1548    Comment   Deja E Ngozi discharge to home/self care.                   Follow-up Information       Follow up With Specialties Details  Why Contact Info    [facility] Schedule an appointment as soon as possible for a visit   [address] [address]  [phone]              Discharge Medication List as of 2/25/2024  3:48 PM        CONTINUE these medications which have NOT CHANGED    Details   albuterol (2.5 mg/3 mL) 0.083 % nebulizer solution Inhale 2.5 mg every 4 (four) hours as needed, Starting Wed 11/1/2023, Until Thu 10/31/2024 at 2359, Historical Med      DULoxetine (CYMBALTA) 30 mg delayed release capsule Take 30 mg by mouth daily, Starting Tue 9/12/2023, Until Wed 9/11/2024, Historical Med      enalapril (VASOTEC) 20 mg tablet Take 40 mg by mouth, Starting Thu 1/5/2023, Until Fri 1/5/2024 at 2359, Historical Med      EPINEPHrine (EPIPEN) 0.3 mg/0.3 mL SOAJ Inject 0.3 mg into a muscle, Starting Thu 12/7/2023, Historical Med      Erdafitinib 4 MG TABS Take 8 mg by mouth daily, Starting Thu 12/21/2023, Historical Med      ondansetron (ZOFRAN-ODT) 4 mg disintegrating tablet Take 1 tablet (4 mg total) by mouth every 8 (eight) hours as needed for nausea or vomiting, Starting Tue 9/19/2023, Print      prochlorperazine (COMPAZINE) 10 mg tablet Take 10 mg by mouth every 6 (six) hours as needed, Starting Wed 11/1/2023, Historical Med      vilazodone (VIIBRYD) 20 mg tablet TAKE 1 TABLET (20 MG TOTAL) BY MOUTH EVERY MORNING., Historical Med             No discharge procedures on file.    PDMP Review       None            ED Provider  Electronically Signed by             Hang Natarajan MD  02/26/24 1897

## 2024-07-21 ENCOUNTER — HOSPITAL ENCOUNTER (EMERGENCY)
Facility: HOSPITAL | Age: 59
Discharge: HOME | End: 2024-07-21
Attending: EMERGENCY MEDICINE | Admitting: EMERGENCY MEDICINE
Payer: MEDICARE

## 2024-07-21 ENCOUNTER — APPOINTMENT (EMERGENCY)
Dept: CT IMAGING | Facility: HOSPITAL | Age: 59
End: 2024-07-21
Payer: MEDICARE

## 2024-07-21 VITALS
RESPIRATION RATE: 20 BRPM | SYSTOLIC BLOOD PRESSURE: 163 MMHG | TEMPERATURE: 98.7 F | HEART RATE: 77 BPM | DIASTOLIC BLOOD PRESSURE: 77 MMHG | WEIGHT: 154.32 LBS | OXYGEN SATURATION: 97 %

## 2024-07-21 DIAGNOSIS — N39.0 UTI (URINARY TRACT INFECTION): Primary | ICD-10-CM

## 2024-07-21 DIAGNOSIS — G43.909 MIGRAINE: ICD-10-CM

## 2024-07-21 LAB
ANION GAP SERPL CALCULATED.3IONS-SCNC: 7 MMOL/L (ref 4–13)
BACTERIA UR QL AUTO: ABNORMAL /HPF
BASOPHILS # BLD AUTO: 0.01 THOUSANDS/ÂΜL (ref 0–0.1)
BASOPHILS NFR BLD AUTO: 0 % (ref 0–1)
BILIRUB UR QL STRIP: NEGATIVE
BUN SERPL-MCNC: 11 MG/DL (ref 5–25)
CALCIUM SERPL-MCNC: 7.8 MG/DL (ref 8.4–10.2)
CHLORIDE SERPL-SCNC: 107 MMOL/L (ref 96–108)
CLARITY UR: CLEAR
CO2 SERPL-SCNC: 24 MMOL/L (ref 21–32)
COLOR UR: ABNORMAL
CREAT SERPL-MCNC: 0.55 MG/DL (ref 0.6–1.3)
EOSINOPHIL # BLD AUTO: 0.06 THOUSAND/ÂΜL (ref 0–0.61)
EOSINOPHIL NFR BLD AUTO: 1 % (ref 0–6)
ERYTHROCYTE [DISTWIDTH] IN BLOOD BY AUTOMATED COUNT: 13.5 % (ref 11.6–15.1)
GFR SERPL CREATININE-BSD FRML MDRD: 103 ML/MIN/1.73SQ M
GLUCOSE SERPL-MCNC: 88 MG/DL (ref 65–140)
GLUCOSE UR STRIP-MCNC: NEGATIVE MG/DL
HCT VFR BLD AUTO: 27.3 % (ref 34.8–46.1)
HGB BLD-MCNC: 8.9 G/DL (ref 11.5–15.4)
HGB UR QL STRIP.AUTO: 10
IMM GRANULOCYTES # BLD AUTO: 0.02 THOUSAND/UL (ref 0–0.2)
IMM GRANULOCYTES NFR BLD AUTO: 0 % (ref 0–2)
KETONES UR STRIP-MCNC: NEGATIVE MG/DL
LEUKOCYTE ESTERASE UR QL STRIP: 500
LYMPHOCYTES # BLD AUTO: 1.29 THOUSANDS/ÂΜL (ref 0.6–4.47)
LYMPHOCYTES NFR BLD AUTO: 27 % (ref 14–44)
MCH RBC QN AUTO: 31.1 PG (ref 26.8–34.3)
MCHC RBC AUTO-ENTMCNC: 32.6 G/DL (ref 31.4–37.4)
MCV RBC AUTO: 96 FL (ref 82–98)
MONOCYTES # BLD AUTO: 0.45 THOUSAND/ÂΜL (ref 0.17–1.22)
MONOCYTES NFR BLD AUTO: 9 % (ref 4–12)
NEUTROPHILS # BLD AUTO: 3.01 THOUSANDS/ÂΜL (ref 1.85–7.62)
NEUTS SEG NFR BLD AUTO: 63 % (ref 43–75)
NITRITE UR QL STRIP: NEGATIVE
NON-SQ EPI CELLS URNS QL MICRO: ABNORMAL /HPF
NRBC BLD AUTO-RTO: 0 /100 WBCS
PH UR STRIP.AUTO: 7 [PH]
PLATELET # BLD AUTO: 240 THOUSANDS/UL (ref 149–390)
PMV BLD AUTO: 9.3 FL (ref 8.9–12.7)
POTASSIUM SERPL-SCNC: 2.9 MMOL/L (ref 3.5–5.3)
PROT UR STRIP-MCNC: NEGATIVE MG/DL
RBC # BLD AUTO: 2.86 MILLION/UL (ref 3.81–5.12)
RBC #/AREA URNS AUTO: ABNORMAL /HPF
SODIUM SERPL-SCNC: 138 MMOL/L (ref 135–147)
SP GR UR STRIP.AUTO: 1.01 (ref 1–1.04)
UROBILINOGEN UA: NEGATIVE MG/DL
WBC # BLD AUTO: 4.84 THOUSAND/UL (ref 4.31–10.16)
WBC #/AREA URNS AUTO: ABNORMAL /HPF

## 2024-07-21 PROCEDURE — 99284 EMERGENCY DEPT VISIT MOD MDM: CPT | Performed by: EMERGENCY MEDICINE

## 2024-07-21 PROCEDURE — 87186 SC STD MICRODIL/AGAR DIL: CPT | Performed by: EMERGENCY MEDICINE

## 2024-07-21 PROCEDURE — 70450 CT HEAD/BRAIN W/O DYE: CPT

## 2024-07-21 PROCEDURE — 87077 CULTURE AEROBIC IDENTIFY: CPT | Performed by: EMERGENCY MEDICINE

## 2024-07-21 PROCEDURE — 81001 URINALYSIS AUTO W/SCOPE: CPT | Performed by: EMERGENCY MEDICINE

## 2024-07-21 PROCEDURE — 96375 TX/PRO/DX INJ NEW DRUG ADDON: CPT

## 2024-07-21 PROCEDURE — 80048 BASIC METABOLIC PNL TOTAL CA: CPT | Performed by: EMERGENCY MEDICINE

## 2024-07-21 PROCEDURE — 99283 EMERGENCY DEPT VISIT LOW MDM: CPT

## 2024-07-21 PROCEDURE — 87086 URINE CULTURE/COLONY COUNT: CPT | Performed by: EMERGENCY MEDICINE

## 2024-07-21 PROCEDURE — 96361 HYDRATE IV INFUSION ADD-ON: CPT

## 2024-07-21 PROCEDURE — 36415 COLL VENOUS BLD VENIPUNCTURE: CPT | Performed by: EMERGENCY MEDICINE

## 2024-07-21 PROCEDURE — 96374 THER/PROPH/DIAG INJ IV PUSH: CPT

## 2024-07-21 PROCEDURE — 85025 COMPLETE CBC W/AUTO DIFF WBC: CPT | Performed by: EMERGENCY MEDICINE

## 2024-07-21 PROCEDURE — 81003 URINALYSIS AUTO W/O SCOPE: CPT | Performed by: EMERGENCY MEDICINE

## 2024-07-21 RX ORDER — POTASSIUM CHLORIDE 20 MEQ/1
40 TABLET, EXTENDED RELEASE ORAL ONCE
Status: COMPLETED | OUTPATIENT
Start: 2024-07-21 | End: 2024-07-21

## 2024-07-21 RX ORDER — PROCHLORPERAZINE MALEATE 10 MG
10 TABLET ORAL EVERY 8 HOURS PRN
Qty: 10 TABLET | Refills: 0 | Status: SHIPPED | OUTPATIENT
Start: 2024-07-21

## 2024-07-21 RX ORDER — CEPHALEXIN 500 MG/1
500 CAPSULE ORAL EVERY 6 HOURS SCHEDULED
Qty: 28 CAPSULE | Refills: 0 | Status: SHIPPED | OUTPATIENT
Start: 2024-07-21 | End: 2024-07-28

## 2024-07-21 RX ORDER — METOCLOPRAMIDE HYDROCHLORIDE 5 MG/ML
10 INJECTION INTRAMUSCULAR; INTRAVENOUS ONCE
Status: COMPLETED | OUTPATIENT
Start: 2024-07-21 | End: 2024-07-21

## 2024-07-21 RX ORDER — DIPHENHYDRAMINE HYDROCHLORIDE 50 MG/ML
12.5 INJECTION INTRAMUSCULAR; INTRAVENOUS ONCE
Status: COMPLETED | OUTPATIENT
Start: 2024-07-21 | End: 2024-07-21

## 2024-07-21 RX ORDER — CEPHALEXIN 500 MG/1
500 CAPSULE ORAL ONCE
Status: COMPLETED | OUTPATIENT
Start: 2024-07-21 | End: 2024-07-21

## 2024-07-21 RX ADMIN — POTASSIUM CHLORIDE 40 MEQ: 1500 TABLET, EXTENDED RELEASE ORAL at 20:27

## 2024-07-21 RX ADMIN — CEPHALEXIN 500 MG: 500 CAPSULE ORAL at 20:53

## 2024-07-21 RX ADMIN — SODIUM CHLORIDE 1000 ML: 0.9 INJECTION, SOLUTION INTRAVENOUS at 20:07

## 2024-07-21 RX ADMIN — METOCLOPRAMIDE 10 MG: 5 INJECTION, SOLUTION INTRAMUSCULAR; INTRAVENOUS at 19:57

## 2024-07-21 RX ADMIN — DIPHENHYDRAMINE HYDROCHLORIDE 12.5 MG: 50 INJECTION, SOLUTION INTRAMUSCULAR; INTRAVENOUS at 19:57

## 2024-07-21 NOTE — ED PROVIDER NOTES
History  Chief Complaint   Patient presents with    Painful Urination     Burning w urination and migraine since this morning - Tylenol and Naproxen taken w no relief - hx of migraines      Patient is a 59-year-old female with a history of metastatic ovarian CA prior hysterectomy who early morning started having some dysuria suprapubic discomfort no  fevers but then she says she developed her typical frontal migraine which she has not had years not relieved with Tylenol and NSAIDs throughout the day denies any weakness no extremity no difficulty speaking or saying vomiting diarrhea chest pain or shortness of breath headache was gradual in onset          Prior to Admission Medications   Prescriptions Last Dose Informant Patient Reported? Taking?   DULoxetine (CYMBALTA) 30 mg delayed release capsule   Yes No   Sig: Take 30 mg by mouth daily   Patient not taking: Reported on 9/18/2023   EPINEPHrine (EPIPEN) 0.3 mg/0.3 mL SOAJ   Yes No   Sig: Inject 0.3 mg into a muscle   Erdafitinib 4 MG TABS   Yes No   Sig: Take 8 mg by mouth daily   albuterol (2.5 mg/3 mL) 0.083 % nebulizer solution   Yes No   Sig: Inhale 2.5 mg every 4 (four) hours as needed   enalapril (VASOTEC) 20 mg tablet   Yes No   Sig: Take 40 mg by mouth   ondansetron (ZOFRAN-ODT) 4 mg disintegrating tablet   No No   Sig: Take 1 tablet (4 mg total) by mouth every 8 (eight) hours as needed for nausea or vomiting   prochlorperazine (COMPAZINE) 10 mg tablet   Yes No   Sig: Take 10 mg by mouth every 6 (six) hours as needed   vilazodone (VIIBRYD) 20 mg tablet   Yes No   Sig: TAKE 1 TABLET (20 MG TOTAL) BY MOUTH EVERY MORNING.      Facility-Administered Medications: None       Past Medical History:   Diagnosis Date    Asthma     Hypertension     Ovarian cancer (HCC)     Prediabetes        Past Surgical History:   Procedure Laterality Date    CT NEEDLE BIOPSY LYMPH NODE  06/30/2022    HYSTERECTOMY         No family history on file.  I have reviewed and agree with  the history as documented.    E-Cigarette/Vaping    E-Cigarette Use Never User      E-Cigarette/Vaping Substances     Social History     Tobacco Use    Smoking status: Never    Smokeless tobacco: Never   Vaping Use    Vaping status: Never Used   Substance Use Topics    Alcohol use: Never    Drug use: Never       Review of Systems   Constitutional:  Negative for chills and fever.   Eyes:  Negative for photophobia, pain and visual disturbance.   Respiratory:  Negative for shortness of breath.    Cardiovascular:  Negative for chest pain.   Gastrointestinal:  Negative for diarrhea and vomiting.   Genitourinary:  Positive for dysuria. Negative for hematuria.   Musculoskeletal:  Negative for arthralgias, back pain and neck pain.   Skin:  Negative for color change and rash.   Neurological:  Positive for headaches. Negative for dizziness, seizures, syncope, speech difficulty, weakness and numbness.   All other systems reviewed and are negative.      Physical Exam  Physical Exam  Vitals and nursing note reviewed.   Constitutional:       General: She is not in acute distress.     Appearance: She is well-developed.   HENT:      Head: Normocephalic and atraumatic.   Eyes:      General:         Right eye: No discharge.         Left eye: No discharge.      Extraocular Movements: Extraocular movements intact.      Conjunctiva/sclera: Conjunctivae normal.      Pupils: Pupils are equal, round, and reactive to light.   Neck:      Vascular: No carotid bruit.   Cardiovascular:      Rate and Rhythm: Normal rate and regular rhythm.      Heart sounds: No murmur heard.  Pulmonary:      Effort: Pulmonary effort is normal. No respiratory distress.      Breath sounds: Normal breath sounds.      Comments: She has a port right upper chest no signs of erythema   Abdominal:      Palpations: Abdomen is soft. There is no mass.      Tenderness: There is no abdominal tenderness. There is no right CVA tenderness, guarding or rebound.    Musculoskeletal:         General: No swelling. Normal range of motion.      Cervical back: Normal range of motion and neck supple. No rigidity or tenderness.   Lymphadenopathy:      Cervical: No cervical adenopathy.   Skin:     General: Skin is warm and dry.      Capillary Refill: Capillary refill takes less than 2 seconds.   Neurological:      General: No focal deficit present.      Mental Status: She is alert.      Cranial Nerves: No cranial nerve deficit.      Sensory: No sensory deficit.      Motor: No weakness.      Comments: oriented   Psychiatric:         Mood and Affect: Mood normal.         Thought Content: Thought content normal.         Vital Signs  ED Triage Vitals [07/21/24 1919]   Temperature Pulse Respirations Blood Pressure SpO2   98.7 °F (37.1 °C) 77 20 163/77 97 %      Temp Source Heart Rate Source Patient Position - Orthostatic VS BP Location FiO2 (%)   Tympanic Monitor Lying Right arm --      Pain Score       --           Vitals:    07/21/24 1919   BP: 163/77   Pulse: 77   Patient Position - Orthostatic VS: Lying         Visual Acuity      ED Medications  Medications   sodium chloride 0.9 % bolus 1,000 mL (1,000 mL Intravenous New Bag 7/21/24 2007)   metoclopramide (REGLAN) injection 10 mg (10 mg Intravenous Given 7/21/24 1957)   diphenhydrAMINE (BENADRYL) injection 12.5 mg (12.5 mg Intravenous Given 7/21/24 1957)   potassium chloride (Klor-Con M20) CR tablet 40 mEq (40 mEq Oral Given 7/21/24 2027)   cephalexin (KEFLEX) capsule 500 mg (500 mg Oral Given 7/21/24 2053)       Diagnostic Studies  Results Reviewed       Procedure Component Value Units Date/Time    Urine Microscopic [[id number]]  (Abnormal) Collected: 07/21/24 2010    Lab Status: Final result Specimen: Urine, Clean Catch Updated: 07/21/24 2028     RBC, UA None Seen /hpf      WBC, UA 20-30 /hpf      Epithelial Cells Occasional /hpf      Bacteria, UA Moderate /hpf     Urine culture [[id number]] Collected: 07/21/24 2010    Lab Status: In  process Specimen: Urine, Clean Catch Updated: 07/21/24 2028    Basic metabolic panel [[id number]]  (Abnormal) Collected: 07/21/24 1951    Lab Status: Final result Specimen: Blood from Line, Venous Updated: 07/21/24 2023     Sodium 138 mmol/L      Potassium 2.9 mmol/L      Chloride 107 mmol/L      CO2 24 mmol/L      ANION GAP 7 mmol/L      BUN 11 mg/dL      Creatinine 0.55 mg/dL      Glucose 88 mg/dL      Calcium 7.8 mg/dL      eGFR 103 ml/min/1.73sq m     Narrative:      National Kid[employer] Foundation guidelines for Chronic Kidney Disease (CKD):     Stage 1 with normal or high GFR (GFR > 90 mL/min/1.73 square meters)    Stage 2 Mild CKD (GFR = 60-89 mL/min/1.73 square meters)    Stage 3A Moderate CKD (GFR = 45-59 mL/min/1.73 square meters)    Stage 3B Moderate CKD (GFR = 30-44 mL/min/1.73 square meters)    Stage 4 Severe CKD (GFR = 15-29 mL/min/1.73 square meters)    Stage 5 End Stage CKD (GFR <15 mL/min/1.73 square meters)  Note: GFR calculation is accurate only with a steady state creatinine    UA w Reflex to Microscopic w Reflex to Culture [[id number]]  (Abnormal) Collected: 07/21/24 2010    Lab Status: Final result Specimen: Urine, Clean Catch Updated: 07/21/24 2019     Color, UA Straw     Clarity, UA Clear     Specific Gravity, UA 1.010     pH, UA 7.0     Leukocytes, .0     Nitrite, UA Negative     Protein, UA Negative mg/dl      Glucose, UA Negative mg/dl      Ketones, UA Negative mg/dl      Bilirubin, UA Negative     Occult Blood, UA 10.0     UROBILINOGEN UA Negative mg/dL     CBC and differential [[id number]]  (Abnormal) Collected: 07/21/24 1951    Lab Status: Final result Specimen: Blood from Line, Venous Updated: 07/21/24 2014     WBC 4.84 Thousand/uL      RBC 2.86 Million/uL      Hemoglobin 8.9 g/dL      Hematocrit 27.3 %      MCV 96 fL      MCH 31.1 pg      MCHC 32.6 g/dL      RDW 13.5 %      MPV 9.3 fL      Platelets 240 Thousands/uL      nRBC 0 /100 WBCs      Segmented % 63 %      Immature Grans  % 0 %      Lymphocytes % 27 %      Monocytes % 9 %      Eosinophils Relative 1 %      Basophils Relative 0 %      Absolute Neutrophils 3.01 Thousands/µL      Absolute Immature Grans 0.02 Thousand/uL      Absolute Lymphocytes 1.29 Thousands/µL      Absolute Monocytes 0.45 Thousand/µL      Eosinophils Absolute 0.06 Thousand/µL      Basophils Absolute 0.01 Thousands/µL                    CT head without contrast   Final Result by Brennan Courtney MD (07/21 2050)      1. No acute large territory infarct, hemorrhage or mass effect. Note that contrast-enhanced MRI is more sensitive for detection of intracranial metastatic disease.      2. Left maxillary sinus mucoid secretions. Correlate for acute sinusitis.                  Workstation performed: [id number]                    Procedures  Procedures         ED Course                                 SBIRT 22yo+      Flowsheet Row Most Recent Value   Initial Alcohol Screen: US AUDIT-C     1. How often do you have a drink containing alcohol? 0 Filed at: 07/21/2024 1921   2. How many drinks containing alcohol do you have on a typical day you are drinking?  0 Filed at: 07/21/2024 1921   3b. FEMALE Any Age, or MALE 65+: How often do you have 4 or more drinks on one occassion? 0 Filed at: 07/21/2024 1921   Audit-C Score 0 Filed at: 07/21/2024 1921   DEVEN: How many times in the past year have you...    Used an illegal drug or used a prescription medication for non-medical reasons? Never Filed at: 07/21/2024 1921                      Medical Decision Making  Patient's urinalysis is consistent with a urinary tract infection cystitis patient placed on antibiotics for that as far as her migraine she is doing somewhat better after medications fluids were given with report which was not going through rapidly patient declined a peripheral IV CT image shows no mass bleed or obvious metastatic lesions she is neurologically intact clinically not subarachnoid hemorrhage patient is felt  safe for discharge his labs consistent with a mild anemia patient had mild hypokalemia    Amount and/or Complexity of Data Reviewed  Labs: ordered.  Radiology: ordered.    Risk  Prescription drug management.                 Disposition  Final diagnoses:   UTI (urinary tract infection)   Migraine     Time reflects when diagnosis was documented in both MDM as applicable and the Disposition within this note       Time User Action Codes Description Comment    7/21/2024  9:13 PM Judson Talavera [N39.0] UTI (urinary tract infection)     7/21/2024  9:13 PM Judson Talavera [G43.909] Migraine           ED Disposition       ED Disposition   Discharge    Condition   Stable    Date/Time   Sun Jul 21, 2024 2118    Comment   Deja E Ngozi discharge to home/self care.                   Follow-up Information       Follow up With Specialties Details Why Contact Info    [facility] In 3 days  [address] [address]  [phone]              Patient's Medications   Discharge Prescriptions    CEPHALEXIN (KEFLEX) 500 MG CAPSULE    Take 1 capsule (500 mg total) by mouth every 6 (six) hours for 7 days       Start Date: 7/21/2024 End Date: 7/28/2024       Order Dose: 500 mg       Quantity: 28 capsule    Refills: 0    PROCHLORPERAZINE (COMPAZINE) 10 MG TABLET    Take 1 tablet (10 mg total) by mouth every 8 (eight) hours as needed for nausea or vomiting (headache)       Start Date: 7/21/2024 End Date: --       Order Dose: 10 mg       Quantity: 10 tablet    Refills: 0       No discharge procedures on file.    PDMP Review       None            ED Provider  Electronically Signed by             Judson Talavera MD  07/21/24 7503

## 2024-07-21 NOTE — Clinical Note
Deja Melvin was seen and treated in our emergency department on 7/21/2024.                Diagnosis:     Deja  .    She may return on this date: 07/23/2024         If you have any questions or concerns, please don't hesitate to call.      Judson Talavera MD    ______________________________           _______________          _______________  Hospital Representative                              Date                                Time

## 2024-07-24 LAB
BACTERIA UR CULT: ABNORMAL
BACTERIA UR CULT: ABNORMAL